# Patient Record
Sex: MALE | Race: WHITE | NOT HISPANIC OR LATINO | URBAN - METROPOLITAN AREA
[De-identification: names, ages, dates, MRNs, and addresses within clinical notes are randomized per-mention and may not be internally consistent; named-entity substitution may affect disease eponyms.]

---

## 2020-08-05 ENCOUNTER — OFFICE VISIT (OUTPATIENT)
Dept: URBAN - METROPOLITAN AREA CLINIC 82 | Facility: CLINIC | Age: 60
End: 2020-08-05
Payer: COMMERCIAL

## 2020-08-05 DIAGNOSIS — K64.8 INTERNAL HEMORRHOIDS: ICD-10-CM

## 2020-08-05 DIAGNOSIS — K92.1 BLOOD IN STOOL: ICD-10-CM

## 2020-08-05 DIAGNOSIS — L29.0 RECTAL ITCHING: ICD-10-CM

## 2020-08-05 PROCEDURE — 99214 OFFICE O/P EST MOD 30 MIN: CPT | Performed by: INTERNAL MEDICINE

## 2020-08-05 PROCEDURE — 4004F PT TOBACCO SCREEN RCVD TLK: CPT | Performed by: INTERNAL MEDICINE

## 2020-08-05 PROCEDURE — 46611 ANOSCOPY: CPT | Performed by: INTERNAL MEDICINE

## 2020-08-05 PROCEDURE — G8417 CALC BMI ABV UP PARAM F/U: HCPCS | Performed by: INTERNAL MEDICINE

## 2020-08-05 PROCEDURE — G8427 DOCREV CUR MEDS BY ELIG CLIN: HCPCS | Performed by: INTERNAL MEDICINE

## 2020-08-05 RX ORDER — HYDROCORTISONE ACETATE 25 MG/1
1 SUPPOSITORY SUPPOSITORY RECTAL TWICE A DAY
Qty: 30 | Refills: 1 | OUTPATIENT
Start: 2020-08-05 | End: 2020-09-02

## 2020-08-05 RX ORDER — DICLOFENAC SODIUM 50 MG/1
1 TABLET TABLET, DELAYED RELEASE ORAL TWICE A DAY
Status: ACTIVE | COMMUNITY

## 2020-08-05 RX ORDER — BUDESONIDE AND FORMOTEROL FUMARATE DIHYDRATE 160; 4.5 UG/1; UG/1
2 PUFFS AEROSOL RESPIRATORY (INHALATION) TWICE A DAY
Status: ACTIVE | COMMUNITY

## 2020-08-05 RX ORDER — FLUCONAZOLE 100 MG/1
1 TABLET TABLET ORAL ONCE A DAY
Qty: 10 | Refills: 0 | OUTPATIENT
Start: 2020-08-05 | End: 2020-08-15

## 2020-08-05 RX ORDER — ESCITALOPRAM 5 MG/1
1 TABLET TABLET, FILM COATED ORAL ONCE A DAY
Status: ACTIVE | COMMUNITY

## 2020-08-05 RX ORDER — ALBUTEROL SULFATE 90 UG/1
1 PUFF AS NEEDED AEROSOL, METERED RESPIRATORY (INHALATION)
Status: ACTIVE | COMMUNITY

## 2020-08-05 NOTE — HPI-TODAY'S VISIT:
08/05/2020  Patient complains of rectal itching, which worsens but he uses anti-itch cream. He states it improves for 2 weeks and then reoccurs. He had periodic episodes of rectal bleeding.

## 2020-08-20 ENCOUNTER — OFFICE VISIT (OUTPATIENT)
Dept: URBAN - METROPOLITAN AREA CLINIC 115 | Facility: CLINIC | Age: 60
End: 2020-08-20

## 2020-09-10 ENCOUNTER — WEB ENCOUNTER (OUTPATIENT)
Dept: URBAN - METROPOLITAN AREA CLINIC 115 | Facility: CLINIC | Age: 60
End: 2020-09-10

## 2020-09-10 ENCOUNTER — OFFICE VISIT (OUTPATIENT)
Dept: URBAN - METROPOLITAN AREA CLINIC 115 | Facility: CLINIC | Age: 60
End: 2020-09-10
Payer: COMMERCIAL

## 2020-09-10 ENCOUNTER — DASHBOARD ENCOUNTERS (OUTPATIENT)
Age: 60
End: 2020-09-10

## 2020-09-10 DIAGNOSIS — L29.0 RECTAL ITCHING: ICD-10-CM

## 2020-09-10 DIAGNOSIS — K64.8 INTERNAL HEMORRHOIDS: ICD-10-CM

## 2020-09-10 DIAGNOSIS — K92.1 BLOOD IN STOOL: ICD-10-CM

## 2020-09-10 PROBLEM — 90446007 PRURITUS ANI: Status: ACTIVE | Noted: 2020-08-05

## 2020-09-10 PROBLEM — 405729008 BLOOD IN STOOL: Status: ACTIVE | Noted: 2020-08-05

## 2020-09-10 PROCEDURE — 3017F COLORECTAL CA SCREEN DOC REV: CPT | Performed by: INTERNAL MEDICINE

## 2020-09-10 PROCEDURE — G8417 CALC BMI ABV UP PARAM F/U: HCPCS | Performed by: INTERNAL MEDICINE

## 2020-09-10 PROCEDURE — G8427 DOCREV CUR MEDS BY ELIG CLIN: HCPCS | Performed by: INTERNAL MEDICINE

## 2020-09-10 PROCEDURE — 99214 OFFICE O/P EST MOD 30 MIN: CPT | Performed by: INTERNAL MEDICINE

## 2020-09-10 PROCEDURE — 1036F TOBACCO NON-USER: CPT | Performed by: INTERNAL MEDICINE

## 2020-09-10 RX ORDER — FLUCONAZOLE 100 MG/1
1 TABLET TABLET ORAL ONCE A DAY
Qty: 10 | Refills: 0 | OUTPATIENT
Start: 2020-09-10 | End: 2020-09-20

## 2020-09-10 RX ORDER — BUDESONIDE AND FORMOTEROL FUMARATE DIHYDRATE 160; 4.5 UG/1; UG/1
2 PUFFS AEROSOL RESPIRATORY (INHALATION) TWICE A DAY
Status: ACTIVE | COMMUNITY

## 2020-09-10 RX ORDER — DICLOFENAC SODIUM 50 MG/1
1 TABLET TABLET, DELAYED RELEASE ORAL TWICE A DAY
Status: ACTIVE | COMMUNITY

## 2020-09-10 RX ORDER — ALBUTEROL SULFATE 90 UG/1
1 PUFF AS NEEDED AEROSOL, METERED RESPIRATORY (INHALATION)
Status: ACTIVE | COMMUNITY

## 2020-09-10 RX ORDER — ESCITALOPRAM 5 MG/1
1 TABLET TABLET, FILM COATED ORAL ONCE A DAY
Status: ACTIVE | COMMUNITY

## 2020-09-10 NOTE — HPI-TODAY'S VISIT:
08/05/2020  Patient complains of rectal itching, which worsens but he uses anti-itch cream. He states it improves for 2 weeks and then reoccurs. He had periodic episodes of rectal bleeding.   09/10/2020 Patient presents for a follow-up visit. He states he feels better. Rectal itching and bleeding has improved. He reports that itching improved with the suppositories. He states that right now everything is under control.